# Patient Record
Sex: MALE | Race: BLACK OR AFRICAN AMERICAN | URBAN - METROPOLITAN AREA
[De-identification: names, ages, dates, MRNs, and addresses within clinical notes are randomized per-mention and may not be internally consistent; named-entity substitution may affect disease eponyms.]

---

## 2021-03-09 ENCOUNTER — HOSPITAL ENCOUNTER (EMERGENCY)
Age: 26
Discharge: OTHER INSTITUTION | End: 2021-03-10
Attending: EMERGENCY MEDICINE

## 2021-03-09 DIAGNOSIS — R45.851 SUICIDAL IDEATION: Primary | ICD-10-CM

## 2021-03-09 LAB
ACETAMINOPHEN LEVEL: <5 UG/ML (ref 15–30)
ALBUMIN SERPL-MCNC: 3.9 GM/DL (ref 3.4–5)
ALCOHOL SCREEN SERUM: <0.01 %WT/VOL
ALP BLD-CCNC: 99 IU/L (ref 40–129)
ALT SERPL-CCNC: 19 U/L (ref 10–40)
AMPHETAMINES: NEGATIVE
ANION GAP SERPL CALCULATED.3IONS-SCNC: 7 MMOL/L (ref 4–16)
AST SERPL-CCNC: 20 IU/L (ref 15–37)
BARBITURATE SCREEN URINE: NEGATIVE
BASOPHILS ABSOLUTE: 0.1 K/CU MM
BASOPHILS RELATIVE PERCENT: 0.7 % (ref 0–1)
BENZODIAZEPINE SCREEN, URINE: NEGATIVE
BILIRUB SERPL-MCNC: 0.3 MG/DL (ref 0–1)
BUN BLDV-MCNC: 15 MG/DL (ref 6–23)
CALCIUM SERPL-MCNC: 8.6 MG/DL (ref 8.3–10.6)
CANNABINOID SCREEN URINE: ABNORMAL
CHLORIDE BLD-SCNC: 103 MMOL/L (ref 99–110)
CO2: 29 MMOL/L (ref 21–32)
COCAINE METABOLITE: NEGATIVE
CREAT SERPL-MCNC: 0.8 MG/DL (ref 0.9–1.3)
DIFFERENTIAL TYPE: ABNORMAL
DOSE AMOUNT: ABNORMAL
DOSE AMOUNT: ABNORMAL
DOSE TIME: ABNORMAL
DOSE TIME: ABNORMAL
EOSINOPHILS ABSOLUTE: 0.1 K/CU MM
EOSINOPHILS RELATIVE PERCENT: 1.2 % (ref 0–3)
GFR AFRICAN AMERICAN: >60 ML/MIN/1.73M2
GFR NON-AFRICAN AMERICAN: >60 ML/MIN/1.73M2
GLUCOSE BLD-MCNC: 103 MG/DL (ref 70–99)
HCT VFR BLD CALC: 48.4 % (ref 42–52)
HEMOGLOBIN: 15.1 GM/DL (ref 13.5–18)
IMMATURE NEUTROPHIL %: 0.4 % (ref 0–0.43)
LYMPHOCYTES ABSOLUTE: 2.2 K/CU MM
LYMPHOCYTES RELATIVE PERCENT: 22.1 % (ref 24–44)
MCH RBC QN AUTO: 25.1 PG (ref 27–31)
MCHC RBC AUTO-ENTMCNC: 31.2 % (ref 32–36)
MCV RBC AUTO: 80.4 FL (ref 78–100)
MONOCYTES ABSOLUTE: 0.9 K/CU MM
MONOCYTES RELATIVE PERCENT: 8.7 % (ref 0–4)
NUCLEATED RBC %: 0 %
OPIATES, URINE: NEGATIVE
OXYCODONE: NEGATIVE
PDW BLD-RTO: 13.2 % (ref 11.7–14.9)
PHENCYCLIDINE, URINE: NEGATIVE
PLATELET # BLD: 241 K/CU MM (ref 140–440)
PMV BLD AUTO: 10.1 FL (ref 7.5–11.1)
POTASSIUM SERPL-SCNC: 3.9 MMOL/L (ref 3.5–5.1)
RBC # BLD: 6.02 M/CU MM (ref 4.6–6.2)
SALICYLATE LEVEL: <0.3 MG/DL (ref 15–30)
SEGMENTED NEUTROPHILS ABSOLUTE COUNT: 6.7 K/CU MM
SEGMENTED NEUTROPHILS RELATIVE PERCENT: 66.9 % (ref 36–66)
SODIUM BLD-SCNC: 139 MMOL/L (ref 135–145)
TOTAL IMMATURE NEUTOROPHIL: 0.04 K/CU MM
TOTAL NUCLEATED RBC: 0 K/CU MM
TOTAL PROTEIN: 6.8 GM/DL (ref 6.4–8.2)
WBC # BLD: 10 K/CU MM (ref 4–10.5)

## 2021-03-09 PROCEDURE — 80053 COMPREHEN METABOLIC PANEL: CPT

## 2021-03-09 PROCEDURE — 80307 DRUG TEST PRSMV CHEM ANLYZR: CPT

## 2021-03-09 PROCEDURE — 99285 EMERGENCY DEPT VISIT HI MDM: CPT

## 2021-03-09 PROCEDURE — 80061 LIPID PANEL: CPT

## 2021-03-09 PROCEDURE — G0480 DRUG TEST DEF 1-7 CLASSES: HCPCS

## 2021-03-09 PROCEDURE — 83721 ASSAY OF BLOOD LIPOPROTEIN: CPT

## 2021-03-09 PROCEDURE — 85025 COMPLETE CBC W/AUTO DIFF WBC: CPT

## 2021-03-09 PROCEDURE — 36415 COLL VENOUS BLD VENIPUNCTURE: CPT

## 2021-03-09 ASSESSMENT — PAIN DESCRIPTION - LOCATION: LOCATION: HIP

## 2021-03-09 ASSESSMENT — PAIN DESCRIPTION - ORIENTATION: ORIENTATION: RIGHT

## 2021-03-09 ASSESSMENT — PAIN DESCRIPTION - PAIN TYPE: TYPE: CHRONIC PAIN

## 2021-03-10 VITALS
OXYGEN SATURATION: 97 % | DIASTOLIC BLOOD PRESSURE: 84 MMHG | SYSTOLIC BLOOD PRESSURE: 140 MMHG | RESPIRATION RATE: 16 BRPM | WEIGHT: 250 LBS | TEMPERATURE: 98.5 F | HEIGHT: 69 IN | HEART RATE: 92 BPM | BODY MASS INDEX: 37.03 KG/M2

## 2021-03-10 LAB
SARS-COV-2, NAAT: NOT DETECTED
SOURCE: NORMAL

## 2021-03-10 PROCEDURE — 87635 SARS-COV-2 COVID-19 AMP PRB: CPT

## 2021-03-10 NOTE — ED NOTES
Patient in paulino bed with eyes closed, no visible signs of distress. Sitter at bedside per 1:1 suicide precautions. Will continue to monitor.      Marty Duran RN  03/10/21 1980

## 2021-03-10 NOTE — ED NOTES
Bed: H-01  Expected date:   Expected time:   Means of arrival:   Comments:  Duanne Boast White, RN  03/09/21 1920

## 2021-03-10 NOTE — ED NOTES
Patient in paulino bed speaking with visitor, no visible signs of distress. Sitter at bedside per 1:1 suicide precautions. Updated on need for urine sample. Will continue to monitor.      Aide Vargas RN  03/09/21 [address], RN  03/09/21 7670

## 2021-03-10 NOTE — ED NOTES
Patient in paulino bed with eyes closed, no visible signs of distress. Sitter at bedside per 1:1 suicide precautions. Will continue to monitor.      Eveline Carrillo RN  03/10/21 9824

## 2021-03-10 NOTE — ED PROVIDER NOTES
Emergency [address] EMERGENCY DEPARTMENT    Patient: Nella Arevalo  MRN: [id number]  : 1995  Date of Evaluation: 3/9/2021  ED Supervising Physician: Miguel Arriaga MD    I independently examined and evaluated Nella Arevalo. In brief, Nella Arevalo is a 27 y.o. male that presents to the emergency department with depression and suicidal ideation. Occasional alcohol and marijuana use. Denies any other complaints. Focused exam: No acute distress. Unlabored respirations. Cooperative. Suicidal.    Brief ED course/MDM: Patient has no other physical/historical findings indicating the need for a further medical workup at this time. There were no medical problems identified which require immediate intervention or which would preclude psychiatric evaluation. Psychiatry consulted for further evaluation and recommendations. Plan for inpatient placement. All diagnostic, treatment, and disposition decisions were made by myself in conjunction with the [facility]. For all further details of the patient's emergency department visit, please see their documentation.     (Please note that portions of this note may have been completed with a voice recognition program. Efforts were made to edit the dictations but occasionally words are mis-transcribed.)    MD Germania Beasley MD  03/10/21 7201

## 2021-03-10 NOTE — ED NOTES
Informed Shayy LOVING of need for Rapid Covid for placement as patient cannot be placed by [facility] until Rapid Covid is resulted and Medical Clearance documented.      Jhony Delgado RN  74/83/92 6659

## 2021-03-10 NOTE — ED NOTES
Patient in paulino bed with eyes closed, no visible signs of distress. Sitter at bedside per 1:1 suicide precautions. Will continue to monitor.      Miracle Aguilar RN  03/10/21 7030

## 2021-03-10 NOTE — ED NOTES
Patient in paulino bed speaking with visitor, no visible signs of distress. Sitter at bedside per 1:1 suicide precautions. Updated on need for urine sample. Will continue to monitor.      Andrea Morales RN  03/09/21 2033
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
## 2021-03-10 NOTE — ED NOTES
Patient in paulino bed with eyes closed, no visible signs of distress. Sitter at bedside per 1:1 suicide precautions. Will continue to monitor.      Rolanda Rodriguez RN  03/10/21 8441

## 2021-03-10 NOTE — ED NOTES
Patient in paulino bed speaking with visitor, no visible signs of distress. Sitter at bedside per 1:1 suicide precautions. Will continue to monitor.      Tatyana Vega RN  03/09/21 2515

## 2021-03-10 NOTE — ED NOTES
Patient in paulino bed with eyes closed, no visible signs of distress. Sitter at bedside per 1:1 suicide precautions. Will continue to monitor.      Maryjane Rubinstein, RN  03/10/21 7625

## 2021-03-10 NOTE — ED NOTES
Report from Bell Castro, pt waiting for [facility] placement by Angelo Zhang for SI. Pt laying on cot with eyes closed and sitter at bedside for 1:1 monitoring for safety.       Valencia De La Cruz RN  03/10/21 9833

## 2021-03-10 NOTE — ED PROVIDER NOTES
Sulma Blackmon was checked out to me by Dr. Mayo Morrow. Please see his/her initial documentation for details of the patient's ED presentation, physical exam and completed studies. In brief, Sulma Blackmon is a 27 y.o. male that presents with substance abuse, depression and suicidal ideation. Labs  Results for orders placed or performed during the hospital encounter of 03/09/21   COVID-19, Rapid    Specimen: Nasopharyngeal   Result Value Ref Range    Source THROAT     SARS-CoV-2, NAAT NOT DETECTED    Ethanol   Result Value Ref Range    Alcohol Scrn <0.01 <9.72 %WT/VOL   Salicylate   Result Value Ref Range    Salicylate Lvl <3.6 (L) 15 - 30 MG/DL    DOSE AMOUNT DOSE AMT. GIVEN - Unknown     DOSE TIME DOSE TIME GIVEN - Unknown    Acetaminophen Level   Result Value Ref Range    Acetaminophen Level <5.0 (L) 15 - 30 ug/ml    DOSE AMOUNT DOSE AMT.  GIVEN - UNKNOWN     DOSE TIME DOSE TIME GIVEN - UNKNOWN    Urine Drug Screen   Result Value Ref Range    Cannabinoid Scrn, Ur UNCONFIRMED POSITIVE (A) NEGATIVE    Amphetamines NEGATIVE NEGATIVE    Cocaine Metabolite NEGATIVE NEGATIVE    Benzodiazepine Screen, Urine NEGATIVE NEGATIVE    Barbiturate Screen, Ur NEGATIVE NEGATIVE    Opiates, Urine NEGATIVE NEGATIVE    Phencyclidine, Urine NEGATIVE NEGATIVE    Oxycodone NEGATIVE NEGATIVE   CBC Auto Differential   Result Value Ref Range    WBC 10.0 4.0 - 10.5 K/CU MM    RBC 6.02 4.6 - 6.2 M/CU MM    Hemoglobin 15.1 13.5 - 18.0 GM/DL    Hematocrit 48.4 42 - 52 %    MCV 80.4 78 - 100 FL    MCH 25.1 (L) 27 - 31 PG    MCHC 31.2 (L) 32.0 - 36.0 %    RDW 13.2 11.7 - 14.9 %    Platelets 791 175 - 736 K/CU MM    MPV 10.1 7.5 - 11.1 FL    Differential Type AUTOMATED DIFFERENTIAL     Segs Relative 66.9 (H) 36 - 66 %    Lymphocytes % 22.1 (L) 24 - 44 %    Monocytes % 8.7 (H) 0 - 4 %    Eosinophils % 1.2 0 - 3 %    Basophils % 0.7 0 - 1 %    Segs Absolute 6.7 K/CU MM    Lymphocytes Absolute 2.2 K/CU MM    Monocytes Absolute 0.9 K/CU MM Eosinophils Absolute 0.1 K/CU MM    Basophils Absolute 0.1 K/CU MM    Nucleated RBC % 0.0 %    Total Nucleated RBC 0.0 K/CU MM    Total Immature Neutrophil 0.04 K/CU MM    Immature Neutrophil % 0.4 0 - 0.43 %   Comprehensive Metabolic Panel w/ Reflex to MG   Result Value Ref Range    Sodium 139 135 - 145 MMOL/L    Potassium 3.9 3.5 - 5.1 MMOL/L    Chloride 103 99 - 110 mMol/L    CO2 29 21 - 32 MMOL/L    BUN 15 6 - 23 MG/DL    CREATININE 0.8 (L) 0.9 - 1.3 MG/DL    Glucose 103 (H) 70 - 99 MG/DL    Calcium 8.6 8.3 - 10.6 MG/DL    Albumin 3.9 3.4 - 5.0 GM/DL    Total Protein 6.8 6.4 - 8.2 GM/DL    Total Bilirubin 0.3 0.0 - 1.0 MG/DL    ALT 19 10 - 40 U/L    AST 20 15 - 37 IU/L    Alkaline Phosphatase 99 40 - 129 IU/L    GFR Non-African American >60 >60 mL/min/1.73m2    GFR African American >60 >60 mL/min/1.73m2    Anion Gap 7 4 - 16       MDM:  Patient endorsed to me pending placement. Patient medically cleared by prior provider. Patient remains in suicide precautions. Patient is evaluated by mental health and they are seeking placement at this time. Patient is pink slipped. Patient is excepted to mental health of [facility]. Transportation is arranged and patient is transferred. Final Impression:  1. Suicidal ideation          Please note that portions of this note may have been complete with a voice recognition program.  Efforts were made to edit the dictations, but occasional words are mis-transcribed.          Rozina Galvan MD  03/10/21 0853

## 2021-03-10 NOTE — ED NOTES
Patient escorted to restroom to change into green gown with 1:1 sitter. Patient provided this RN with contact numbers  Joy Aguero (partner): [phone]  Iline Apgar (friend): [phone]  Iline Apgar (friend) is currently at bedside.      Tatyana Vega RN  [phone]
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
## 2021-03-10 NOTE — ED NOTES
This RN called [facility] for placement. [facility] seeking placement stating they will start with [facility].      Mal Sterling RN  03/10/21 8999

## 2021-03-10 NOTE — ED NOTES
Patient in paulino bed speaking with visitor, no visible signs of distress. Sitter at bedside per 1:1 suicide precautions. Will continue to monitor.      Yessy Maya RN  03/09/21 0186

## 2021-03-10 NOTE — ED NOTES
Patient in paulino bed with eyes closed, no visible signs of distress. Sitter at bedside per 1:1 suicide precautions. Will continue to monitor.      Tatyana Vega RN  03/10/21 Vickie García, RN  03/10/21 7805

## 2021-03-10 NOTE — ED PROVIDER NOTES
eMERGENCY dEPARTMENT eNCOUnter        [address] [facility]    Chief Complaint   Patient presents with    Suicidal       HPI    Alyssa Bernal is a 27 y.o. male who presents with suicidal ideation. Patient states he has been depressed for awhile, recently started seeing a counselor. He states he has been having suicidal thoughts for awhile, various plans. Today he lashed out against his wife and kids so he decided it was time to get help. He denies any homicidal ideation. Denies any visual or auditory hallucinations. Does admit to occasional ETOH and marijuana use. REVIEW OF SYSTEMS    See HPI for further details. Review of systems otherwise negative. Constitutional:  Denies fever, chills. HENT:  Denies headache, dizziness, lightheadedness. Respiratory:  Denies any shortness of breath. Cardiovascular:  Denies chest pain, palpitations. GI:  Denies abdominal pain, nausea, vomiting, diarrhea. :  Denies dysuria. Musculoskeletal:  Denies edema, tenderness. Integument:  Denies rashes, lesions. Neurologic:  Denies altered mental status. Denies any numbness or tingling. Psychiatric:  + depression, + suicidal ideation, denies hallucinations, denies drug abuse    PAST MEDICAL HISTORY    Past Medical History:   Diagnosis Date    Anxiety     Depression        SURGICAL HISTORY    Past Surgical History:   Procedure Laterality Date    TONSILLECTOMY         CURRENT MEDICATIONS        ALLERGIES    No Known Allergies    FAMILY HISTORY    No family history on file.     SOCIAL HISTORY    Social History     Socioeconomic History    Marital status:      Spouse name: Not on file    Number of children: Not on file    Years of education: Not on file    Highest education level: Not on file   Occupational History    Not on file   Social Needs    Financial resource strain: Not on file    Food insecurity     Worry: Not on file     Inability: Not on file    Transportation needs     Medical: Not on file     Non-medical: Not on file   Tobacco Use    Smoking status: Never Smoker    Smokeless tobacco: Never Used   Substance and Sexual Activity    Alcohol use: Yes     Comment: several times a year    Drug use: Not Currently    Sexual activity: Not on file   Lifestyle    Physical activity     Days per week: Not on file     Minutes per session: Not on file    Stress: Not on file   Relationships    Social connections     Talks on phone: Not on file     Gets together: Not on file     Attends Religion service: Not on file     Active member of club or organization: Not on file     Attends meetings of clubs or organizations: Not on file     Relationship status: Not on file    Intimate partner violence     Fear of current or ex partner: Not on file     Emotionally abused: Not on file     Physically abused: Not on file     Forced sexual activity: Not on file   Other Topics Concern    Not on file   Social History Narrative    Not on file       PHYSICAL EXAM    VITAL SIGNS: BP (!) 141/85   Pulse 94   Temp 98.3 °F (36.8 °C) (Oral)   Resp 18   Ht 5' 9\" (1.753 m)   Wt 250 lb (113.4 kg)   SpO2 98%   BMI 36.92 kg/m²   Constitutional:  Well developed, well nourished, no acute distress, non-toxic appearance   Eyes:  PERRL, EOMI. Conjunctiva normal.  HENT:  Atraumatic, external ears normal, nose normal, oropharynx moist. Neck- supple   Respiratory:  Lungs CTAB. Cardiovascular:  RRR, no M/R/G.   GI:  Soft, non-tender. Bowel sounds active. Musculoskeletal:  No edema, no tenderness, no  deformities. Integument:  Well hydrated. Neurologic:  Alert and oriented. Psychiatric:  Pleasant, cooperative.      RADIOLOGY/PROCEDURES    Labs Reviewed   CBC WITH AUTO DIFFERENTIAL - Abnormal; Notable for the following components:       Result Value    MCH 25.1 (*)     MCHC 31.2 (*)     Segs Relative 66.9 (*)     Lymphocytes % 22.1 (*)     Monocytes % 8.7 (*)     All other components within normal limits   ETHANOL SALICYLATE LEVEL   ACETAMINOPHEN LEVEL   URINE DRUG SCREEN   COMPREHENSIVE METABOLIC PANEL W/ REFLEX TO MG FOR LOW K       ED COURSE & MEDICAL DECISION MAKING    Pertinent Labs & Imaging studies reviewed. (See chart for details)  -  Patient seen and evaluated in the emergency department. -  Triage and nursing notes reviewed and incorporated. -  Old chart records reviewed and incorporated. -  Supervising physician was Dr. Kevin Ovalles. Patient was seen independently. -  Differential diagnosis includes: depression, suicidal, behavior disorder, schizophrenia, schizoaffective disorder, manic, dementia, delirium, alcohol intoxication, drug toxicity, and others  -  Work-up included:  See above  -  Patient medically cleared. Consult was placed to Mental Health for evaluation. Patient was seen and evaluated by Vicente Diaz, who recommends placement. Acceptance pending at the end of my shift. Signed out to oncoming provider. Please see their note for further details. In light of current events, I did utilize appropriate PPE (including N95 and surgical face mask, safety glasses, and gloves, as recommended by the health facility/national standard best practice, during my bedside interactions with the patient. FINAL IMPRESSION    1.  Suicidal ideation                Tiffanie Gilmore, [place]  03/10/21 1912

## 2021-03-10 NOTE — ED NOTES
Patient in paulino bed with eyes closed, no visible signs of distress. Sitter at bedside per 1:1 suicide precautions. Will continue to monitor.      Jose Rosado RN  03/10/21 6862

## 2021-03-10 NOTE — ED NOTES
Patient ambulating to restroom with sitter, no visible signs of distress. Will continue to monitor.      Allison Lara RN  03/09/21 555  148 CHRIS Hoffmann  03/09/21 0814

## 2021-03-11 LAB
CHOLESTEROL: 183 MG/DL
HDLC SERPL-MCNC: 34 MG/DL
LDL CHOLESTEROL DIRECT: 127 MG/DL
TRIGL SERPL-MCNC: 213 MG/DL